# Patient Record
Sex: MALE | Race: WHITE | Employment: FULL TIME | URBAN - METROPOLITAN AREA
[De-identification: names, ages, dates, MRNs, and addresses within clinical notes are randomized per-mention and may not be internally consistent; named-entity substitution may affect disease eponyms.]

---

## 2024-07-30 ENCOUNTER — OFFICE VISIT (OUTPATIENT)
Dept: UROLOGY | Age: 34
End: 2024-07-30
Payer: COMMERCIAL

## 2024-07-30 DIAGNOSIS — I86.1 VARICOCELE: ICD-10-CM

## 2024-07-30 DIAGNOSIS — N50.819 PAIN IN TESTICLE, UNSPECIFIED LATERALITY: Primary | ICD-10-CM

## 2024-07-30 PROCEDURE — 99244 OFF/OP CNSLTJ NEW/EST MOD 40: CPT | Performed by: UROLOGY

## 2024-07-30 ASSESSMENT — ENCOUNTER SYMPTOMS
EYES NEGATIVE: 1
RESPIRATORY NEGATIVE: 1

## 2024-07-30 NOTE — PROGRESS NOTES
[facility]  [address] [place] [address]  [phone]    Dariel Woods  : 1990      Chief Complaint   Patient presents with    New Patient    Testicle Pain    Testicle Swelling        HPI   34 y.o., male who is referred by Dr. Smart for evaluation of L groin discomfort and L orchalgia.  Pt reports L orchalgia since undergoing a vasectomy 7y ago by Dr. Fair in [place].  He states that procedure was complicated by post op hematoma.  Pain worst with activity.  Denies any recent trauma.  Mild freq.  CARRIE on 7/3/24 shows small bilateral hydroceles, a small R epididymal cyst and L varicocele.  Has tried NSAIDS in past with some relief.        No past medical history on file.  Past Surgical History:   Procedure Laterality Date    VASECTOMY       No current outpatient medications on file.     No current facility-administered medications for this visit.     No Known Allergies  Social History     Socioeconomic History    Marital status:      Spouse name: Not on file    Number of children: Not on file    Years of education: Not on file    Highest education level: Not on file   Occupational History    Not on file   Tobacco Use    Smoking status: Never    Smokeless tobacco: Former     Quit date:    Substance and Sexual Activity    Alcohol use: Yes     Alcohol/week: 7.0 standard drinks of alcohol     Types: 7 Standard drinks or equivalent per week    Drug use: Not on file    Sexual activity: Not on file   Other Topics Concern    Not on file   Social History Narrative    Not on file     Social Determinants of Health     Financial Resource Strain: Not on file   Food Insecurity: Not on file   Transportation Needs: Not on file   Physical Activity: Not on file   Stress: Not on file   Social Connections: Not on file   Intimate Partner Violence: Not on file   Housing Stability: Not on file     No family history on file.      Review of Systems  Constitutional: Negative  Skin: